# Patient Record
Sex: MALE | Race: WHITE | HISPANIC OR LATINO | Employment: FULL TIME | URBAN - METROPOLITAN AREA
[De-identification: names, ages, dates, MRNs, and addresses within clinical notes are randomized per-mention and may not be internally consistent; named-entity substitution may affect disease eponyms.]

---

## 2022-08-08 ENCOUNTER — APPOINTMENT (EMERGENCY)
Dept: VASCULAR ULTRASOUND | Facility: HOSPITAL | Age: 52
End: 2022-08-08

## 2022-08-08 ENCOUNTER — APPOINTMENT (EMERGENCY)
Dept: RADIOLOGY | Facility: HOSPITAL | Age: 52
End: 2022-08-08

## 2022-08-08 ENCOUNTER — HOSPITAL ENCOUNTER (EMERGENCY)
Facility: HOSPITAL | Age: 52
Discharge: HOME | End: 2022-08-08
Attending: INTERNAL MEDICINE | Admitting: INTERNAL MEDICINE

## 2022-08-08 VITALS
OXYGEN SATURATION: 100 % | HEART RATE: 72 BPM | BODY MASS INDEX: 28.25 KG/M2 | TEMPERATURE: 98.9 F | DIASTOLIC BLOOD PRESSURE: 101 MMHG | HEIGHT: 65 IN | RESPIRATION RATE: 18 BRPM | SYSTOLIC BLOOD PRESSURE: 155 MMHG | WEIGHT: 169.53 LBS

## 2022-08-08 DIAGNOSIS — M25.562 CHRONIC PAIN OF LEFT KNEE: Primary | ICD-10-CM

## 2022-08-08 DIAGNOSIS — M65.262 CALCIFIC TENDINITIS OF LEFT KNEE: ICD-10-CM

## 2022-08-08 DIAGNOSIS — G89.29 CHRONIC PAIN OF LEFT KNEE: Primary | ICD-10-CM

## 2022-08-08 DIAGNOSIS — M71.22 BAKER CYST, LEFT: ICD-10-CM

## 2022-08-08 PROCEDURE — 93971 EXTREMITY STUDY: CPT | Performed by: SURGERY

## 2022-08-08 PROCEDURE — 99285 EMERGENCY DEPT VISIT HI MDM: CPT | Performed by: PHYSICIAN ASSISTANT

## 2022-08-08 PROCEDURE — 99284 EMERGENCY DEPT VISIT MOD MDM: CPT

## 2022-08-08 PROCEDURE — 93971 EXTREMITY STUDY: CPT

## 2022-08-08 PROCEDURE — 73564 X-RAY EXAM KNEE 4 OR MORE: CPT

## 2022-08-08 PROCEDURE — 96372 THER/PROPH/DIAG INJ SC/IM: CPT

## 2022-08-08 RX ORDER — NAPROXEN 500 MG/1
500 TABLET ORAL 2 TIMES DAILY WITH MEALS
Qty: 30 TABLET | Refills: 0 | Status: SHIPPED | OUTPATIENT
Start: 2022-08-08

## 2022-08-08 RX ORDER — KETOROLAC TROMETHAMINE 30 MG/ML
15 INJECTION, SOLUTION INTRAMUSCULAR; INTRAVENOUS ONCE
Status: COMPLETED | OUTPATIENT
Start: 2022-08-08 | End: 2022-08-08

## 2022-08-08 RX ADMIN — KETOROLAC TROMETHAMINE 15 MG: 30 INJECTION, SOLUTION INTRAMUSCULAR at 11:29

## 2022-08-08 NOTE — ED PROVIDER NOTES
History  Chief Complaint   Patient presents with    Knee Pain     L knee pain x1 year, denies injury     This is a 50-year-old male with no significant past medical history presenting to the emergency department today for left knee pain  Knee pain is been ongoing for 1 year but notes he is feeling bone on bone sensation which made him come to the emergency department for evaluation today  He denies any known injury but did work as a  and is frequently on his feet  He is also noting new onset left-sided calf pain  He denies any chest pain or shortness of breath  He denies any difficulty with ambulation  Does not follow-up with orthopedics  No numbness or tingling  He denies any history of DVT  The patient denies other complaints at this time  History provided by:  Patient   used: No    Knee Pain  Location:  Knee  Time since incident: one year  Injury: no    Knee location:  L knee  Chronicity:  Chronic  Dislocation: no    Foreign body present:  Unable to specify  Tetanus status:  Unknown  Prior injury to area:  No  Relieved by:  None tried  Exacerbated by: movement; bearing weight  Ineffective treatments:  None tried  Associated symptoms: stiffness    Associated symptoms: no back pain, no decreased ROM, no fatigue, no fever, no itching, no muscle weakness, no neck pain, no numbness, no swelling and no tingling        None       History reviewed  No pertinent past medical history  History reviewed  No pertinent surgical history  History reviewed  No pertinent family history  I have reviewed and agree with the history as documented  E-Cigarette/Vaping    E-Cigarette Use Never User      E-Cigarette/Vaping Substances     Social History     Tobacco Use    Smoking status: Current Some Day Smoker     Packs/day: 0 25     Types: Cigarettes    Smokeless tobacco: Never Used   Vaping Use    Vaping Use: Never used   Substance Use Topics    Alcohol use:  Yes    Drug use: Not Currently       Review of Systems   Constitutional: Negative for appetite change, chills, diaphoresis, fatigue and fever  Eyes: Negative for visual disturbance  Respiratory: Negative for cough, chest tightness, shortness of breath and wheezing  Cardiovascular: Negative for chest pain, palpitations and leg swelling  Gastrointestinal: Negative for abdominal pain, constipation, diarrhea, nausea and vomiting  Genitourinary: Negative for dysuria  Musculoskeletal: Positive for arthralgias (left knee) and stiffness  Negative for back pain, joint swelling, neck pain and neck stiffness  Skin: Negative for itching, rash and wound  Neurological: Negative for dizziness, seizures, syncope, weakness, light-headedness, numbness and headaches  Psychiatric/Behavioral: Negative for confusion  All other systems reviewed and are negative  Physical Exam  Physical Exam  Vitals and nursing note reviewed  Constitutional:       General: He is not in acute distress  Appearance: Normal appearance  He is normal weight  He is not ill-appearing, toxic-appearing or diaphoretic  HENT:      Head: Normocephalic and atraumatic  Nose: Nose normal  No congestion or rhinorrhea  Mouth/Throat:      Mouth: Mucous membranes are moist       Pharynx: No oropharyngeal exudate or posterior oropharyngeal erythema  Eyes:      General: No scleral icterus  Right eye: No discharge  Left eye: No discharge  Conjunctiva/sclera: Conjunctivae normal    Cardiovascular:      Rate and Rhythm: Normal rate and regular rhythm  Pulses: Normal pulses  Heart sounds: Normal heart sounds  No murmur heard  No friction rub  No gallop  Comments: 2+ DP pulses bilaterally  Pulmonary:      Effort: Pulmonary effort is normal  No respiratory distress  Breath sounds: Normal breath sounds  No stridor  No wheezing, rhonchi or rales  Chest:      Chest wall: No tenderness     Musculoskeletal: General: Normal range of motion  Cervical back: Normal range of motion  No rigidity  Right lower leg: No edema  Left lower leg: No edema  Comments: Positive left-sided Umair sign; bilateral calves have soft compartments; negative right-sided Umair sign  Normal ability to flex and extend at left knee  Mild tenderness to palpation anteriorly and posteriorly to the left knee joint; no medial or lateral tenderness to palpation; no ligamentous laxity; negative Lachman   Skin:     General: Skin is warm and dry  Capillary Refill: Capillary refill takes less than 2 seconds  Coloration: Skin is not jaundiced or pale  Neurological:      General: No focal deficit present  Mental Status: He is alert and oriented to person, place, and time  Mental status is at baseline        Comments: 5/5 strength to bilateral lower extremities  Normal sensation of bilateral lower extremities   Psychiatric:         Mood and Affect: Mood normal          Behavior: Behavior normal          Vital Signs  ED Triage Vitals [08/08/22 1058]   Temperature Pulse Respirations Blood Pressure SpO2   98 9 °F (37 2 °C) 72 18 (!) 155/101 100 %      Temp Source Heart Rate Source Patient Position - Orthostatic VS BP Location FiO2 (%)   Oral Monitor Sitting Left arm --      Pain Score       9           Vitals:    08/08/22 1058   BP: (!) 155/101   Pulse: 72   Patient Position - Orthostatic VS: Sitting         Visual Acuity      ED Medications  Medications   ketorolac (TORADOL) injection 15 mg (15 mg Intramuscular Given 8/8/22 1129)       Diagnostic Studies  Results Reviewed     None                 XR knee 4+ views left injury   ED Interpretation by Lara Cosby PA-C (08/08 1120)   Degenerative changes noted to knee; calcific tendinitis noted      VAS lower limb venous duplex study, unilateral/limited    (Results Pending)              Procedures  Procedures         ED Course  ED Course as of 08/08/22 1200   Mon Aug 08, 2022 1139 Small left sided Baker's cyst  No DVT  MDM  Number of Diagnoses or Management Options  Baker cyst, left: new and requires workup  Calcific tendinitis of left knee: new and requires workup  Chronic pain of left knee: new and requires workup  Diagnosis management comments: This is a 48-year-old male presenting to the emergency department today for left-sided knee pain  Ongoing for 1 year  Also having left-sided calf pain that is more recent  Vital signs are stable  He has no chest pain or shortness of breath  Left knee x-ray shows calcific tendinitis and degenerative changes but no acute osseous abnormalities  Left venous duplex shows a small Baker cyst but is negative for DVT  The patient was given 1 dose of Toradol here in the emergency department  The patient is stable for discharge at this time  Recommend orthopedic follow-up  Naproxen and Voltaren cream sent to the patient's pharmacy  Strict return precautions were given  Recommend PCP follow-up as soon as possible  The patient and/or patient's proxy verify their understanding and agree to the plan at this time  All questions answered to the patient and/or their proxy's satisfaction  All labs reviewed and utilized in the medical decision making process  All radiology studies independently viewed by me and interpreted by the radiologist  Portions of the record may have been created with voice recognition software   Occasional wrong word or "sound a like" substitutions may have occurred due to the inherent limitations of voice recognition software   Read the chart carefully and recognize, using context, where substitutions have occurred         Amount and/or Complexity of Data Reviewed  Tests in the radiology section of CPT®: ordered and reviewed  Independent visualization of images, tracings, or specimens: yes (XR Left Knee)    Patient Progress  Patient progress: stable      Disposition  Final diagnoses:   Chronic pain of left knee   Baker cyst, left   Calcific tendinitis of left knee     Time reflects when diagnosis was documented in both MDM as applicable and the Disposition within this note     Time User Action Codes Description Comment    8/8/2022 11:52 AM Evangelista Cruz [G63 974,  G89 29] Chronic pain of left knee     8/8/2022 11:52 AM Gene Cruz [M71 22] Baker cyst, left     8/8/2022 11:53 AM Evangelista Cruz [T32 717] Calcific tendinitis of left knee       ED Disposition     ED Disposition   Discharge    Condition   Stable    Date/Time   Mon Aug 8, 2022 11:52 AM    Comment   Prince Castillo discharge to home/self care                 Follow-up Information     Follow up With Specialties Details Why Contact Info Additional [address] Emergency Department Emergency Medicine Go to  If symptoms worsen [address] [facility],[address] [address]  [address] [facility] Emergency Department, [address], [address] [facility] Orthopedic Surgery Schedule an appointment as soon as possible for a visit   [address] [facility],[address] [address] [phone] [address] [facility] [address] [facility] [phone] [address] Schedule an appointment as soon as possible for a visit   [address] [address]  [address] , [place], [place], [address]          Patient's Medications   Discharge Prescriptions    DICLOFENAC SODIUM (VOLTAREN) 1 %    Apply 2 g topically 4 (four) times a day For pain to affected area       Start Date: 8/8/2022  End Date: --       Order Dose: 2 g       Quantity: 100 g    Refills: 0    NAPROXEN (NAPROSYN) 500 MG TABLET    Take 1 tablet (500 mg total) by mouth 2 (two) times a day with meals       Start Date: 8/8/2022  End Date: --       Order Dose: 500 mg       Quantity: 30 tablet    Refills: 0       No discharge procedures on file      PDMP Review     None          ED Provider  Electronically Signed by           Ad Carter PA-C  08/08/22 1200 none...

## 2022-08-08 NOTE — TELEPHONE ENCOUNTER
Patient seen in ER  Attempted to make appt for ortho  Advised of self pay policy  Patient has no MA  Presented him with number for [address]

## 2022-08-08 NOTE — DISCHARGE INSTRUCTIONS
Please return to the emergency department for worsening symptoms including chest pain, shortness of breath, dizziness, lightheadedness, fever greater than 103, severe pain, inability to walk, fainting episodes, etc  Please follow-up with your family practice provider as soon as possible  I sent 2 medications over to the pharmacy for your symptoms  Please take as directed  Please follow-up with an orthopedist as soon as possible  They will be able to help manage your chronic arthritis to the left knee  No difficulties

## 2022-08-27 ENCOUNTER — OFFICE VISIT (OUTPATIENT)
Dept: OBGYN CLINIC | Facility: CLINIC | Age: 52
End: 2022-08-27

## 2022-08-27 VITALS — OXYGEN SATURATION: 98 % | HEIGHT: 65 IN | HEART RATE: 63 BPM | BODY MASS INDEX: 28.89 KG/M2 | WEIGHT: 173.4 LBS

## 2022-08-27 DIAGNOSIS — M17.12 PRIMARY OSTEOARTHRITIS OF LEFT KNEE: ICD-10-CM

## 2022-08-27 DIAGNOSIS — G89.29 CHRONIC PAIN OF LEFT KNEE: Primary | ICD-10-CM

## 2022-08-27 DIAGNOSIS — M25.562 CHRONIC PAIN OF LEFT KNEE: Primary | ICD-10-CM

## 2022-08-27 PROCEDURE — 99214 OFFICE O/P EST MOD 30 MIN: CPT | Performed by: PHYSICIAN ASSISTANT

## 2022-08-27 PROCEDURE — 20610 DRAIN/INJ JOINT/BURSA W/O US: CPT | Performed by: PHYSICIAN ASSISTANT

## 2022-08-27 RX ORDER — BUPIVACAINE HYDROCHLORIDE 2.5 MG/ML
2 INJECTION, SOLUTION INFILTRATION; PERINEURAL
Status: COMPLETED | OUTPATIENT
Start: 2022-08-27 | End: 2022-08-27

## 2022-08-27 RX ORDER — TRIAMCINOLONE ACETONIDE 40 MG/ML
80 INJECTION, SUSPENSION INTRA-ARTICULAR; INTRAMUSCULAR
Status: COMPLETED | OUTPATIENT
Start: 2022-08-27 | End: 2022-08-27

## 2022-08-27 RX ADMIN — TRIAMCINOLONE ACETONIDE 80 MG: 40 INJECTION, SUSPENSION INTRA-ARTICULAR; INTRAMUSCULAR at 10:20

## 2022-08-27 RX ADMIN — BUPIVACAINE HYDROCHLORIDE 2 ML: 2.5 INJECTION, SOLUTION INFILTRATION; PERINEURAL at 10:20

## 2022-08-27 NOTE — PROGRESS NOTES
Orthopaedic Surgery - Office Note  Merna Bran (1 y o  male)   : 1970   MRN: [id number]  Encounter Date: 2022    Chief Complaint   Patient presents with    Left Knee - Pain         Assessment/Plan  Diagnoses and all orders for this visit:    Chronic pain of left knee    Primary osteoarthritis of left knee    Other orders  -     Large joint arthrocentesis    I reviewed with the patient he has advanced degenerative changes of his left knee  Conservative treatment options would consist of ice oral anti-inflammatory such as Aleve with food, bracing, physical therapy, cortisone injections, Visco injections, and the definitive treatment for the left knee osteoarthritis would be a total knee replacement once conservative treatments have failed  After reviewing treatment options and his insurance situation he would like to try a cortisone injection today and consider the Visco injections in the future if the cortisone injection does not help  He will be provided a Khmer version home exercise sheet for the need to begin working on  He will continue to ice the knee 20 minutes on 1 hour off 3 times a day and use Aleve with food as needed for pain and inflammation  Return in 2-3 months with Dr Kristan Chirinos  History of Present Illness  This is a previous orthopedic patient here for a repeat evaluation of his chronic left knee pain  Patient has had ongoing left knee pain for at least 4 years  He was evaluated by Dr Kristan Chirinos in 2022 for this condition  Treatment options were reviewed at that time but due to his insurance situation patient declined to consider the cortisone injection  Since that time he has gone to the emergency room due to the left knee pain and states he is working on getting medical assistance  He would like to consider an injection at this time in the left knee  He denies any new trauma to the left knee    He does not speak [place] and translation is provided through his mother at the patient's request   No calf pain or paresthesias are reported  He denies any fever or chills  He denies being diabetic  Review of Systems  Pertinent items are noted in HPI  All other systems were reviewed and are negative  Physical Exam  Pulse 63   Ht 5' 5" (1 651 m)   Wt 78 7 kg (173 lb 6 4 oz)   SpO2 98%   BMI 28 86 kg/m²   Cons: Appears well  No apparent distress  Psych: Alert  Oriented x3  Mood and affect normal   Eyes: PERRLA, EOMI  Resp: Normal effort  No audible wheezing or stridor  CV: Palpable pulse  No discernable arrhythmia  Lymph:  No palpable cervical, axillary, or inguinal lymphadenopathy  Skin: Warm  No palpable masses  No visible lesions  Neuro: Normal muscle tone  Normal and symmetric DTR's  Left knee is without skin breakdown lesions or signs of infection  There is no intra-articular effusion today  He is tender on the medial and lateral joint line with noted varus deformity  He has near full extension and flexes to 120° with noted crepitus  Quad and hamstring strength are 5/5  There is no calf tenderness and a negative Homans  He has no instability to valgus or varus stress at full extension and 30°  He has no instability to Lachman's testing  His gait is heel-to-toe  He has a negative straight leg raise          Studies Reviewed  Study Result    Narrative & Impression   LEFT KNEE     INDICATION:   Knee Pain      COMPARISON:  None     VIEWS:  XR KNEE 4+ VW LEFT INJURY   Images: 4     FINDINGS:     There is no acute fracture or dislocation      There is no joint effusion      Moderate to severe degenerative changes involve the medial and patellofemoral compartments  Mild degenerative changes lateral compartment     No lytic or blastic osseous lesion      Soft tissues are unremarkable      IMPRESSION:  Tricompartmental degenerative arthritis  No acute osseous abnormality            Workstation performed: [id number]     X-ray images as well as reports were reviewed by myself in the office today and I agree were 30 all just interpretation of advanced degenerative changes  Emergency department notes from 08/08/2022 reviewed by myself in the office today  Large joint arthrocentesis: L knee  Universal Protocol:  Consent: Verbal consent obtained  Risks and benefits: risks, benefits and alternatives were discussed  Consent given by: patient  Time out: Immediately prior to procedure a "time out" was called to verify the correct patient, procedure, equipment, support staff and site/side marked as required  Patient understanding: patient states understanding of the procedure being performed  Patient consent: the patient's understanding of the procedure matches consent given  Relevant documents: relevant documents present and verified  Test results: test results available and properly labeled  Site marked: the operative site was marked  Radiology Images displayed and confirmed  If images not available, report reviewed: imaging studies available  Patient identity confirmed: verbally with patient    Supporting Documentation  Indications: pain   Procedure Details  Location: knee - L knee  Preparation: Patient was prepped and draped in the usual sterile fashion  Needle size: 22 G  Ultrasound guidance: no  Approach: anterolateral  Medications administered: 2 mL bupivacaine 0 25 %; 80 mg triamcinolone acetonide 40 mg/mL    Patient tolerance: patient tolerated the procedure well with no immediate complications  Dressing:  Sterile dressing applied        Medical, Surgical, Family, and Social History  The patient's medical history, family history, and social history, were reviewed and updated as appropriate  History reviewed  No pertinent past medical history  History reviewed  No pertinent surgical history  History reviewed  No pertinent family history      Social History     Occupational History    Not on file   Tobacco Use    Smoking status: Current Some Day Smoker     Packs/day: 0 25     Types: Cigarettes    Smokeless tobacco: Never Used   Vaping Use    Vaping Use: Never used   Substance and Sexual Activity    Alcohol use:  Yes    Drug use: Not Currently    Sexual activity: Not on file       No Known Allergies      Current Outpatient Medications:     Diclofenac Sodium (VOLTAREN) 1 %, Apply 2 g topically 4 (four) times a day For pain to affected area, Disp: 100 g, Rfl: 0    naproxen (Naprosyn) 500 mg tablet, Take 1 tablet (500 mg total) by mouth 2 (two) times a day with meals, Disp: 30 tablet, Rfl: 0      Adam Saez PA-C

## 2022-08-27 NOTE — PATIENT INSTRUCTIONS
Ejercicios para la rodilla   CUIDADO AMBULATORIO:   Lo que necesita saber acerca de los ejercicios para la rodilla: Los ejercicios para la rodilla ayudan a fortalecer los músculos alrededor de burnham rodilla  Unos músculos cecille pueden ayudar a reducir el dolor y disminuir el riesgo de sufrir rd lesión en el futuro  Los ejercicios para la rodilla también pueden ayudarlo a recuperarse después de [facility] Su Mohry Schwab  Empiece despacio  Estos son Concepcion Taylor básicos  Pregúntele a burnham médico si usted necesita acudir con un fisioterapeuta para que le indique ejercicios más avanzado  A medida que se sienta más jacklyn, es posible que pueda realizar más series de cada ejercicio o añadir pesas  Deténgase si siente dolor  Es normal que sienta cierta molestia al principio  Practicar los ejercicios con regularidad ayudará a disminuir burnham incomodidad con el paso del Joy  Realice los [id number] [facility] Jen  Owen esto para [address] [facility] [address] se mantengan cecille  Entre en calor antes de hacer los Concepcion Brandon para la rodilla  Use rd bicicleta estacionaria o camine wang 5 a 10 minutos para que nolvia músculos entren en calor  Cómo realizar estiramientos de la rodilla de forma shah: Siempre ejecute los ejercicios de calentamiento antes de hacer cualquier ejercicio de acondicionamiento  Owen estos ejercicios de estiramiento rd vez más después de hacer los ejercicios de fortalecimiento  Realice estos ejercicios de estiramiento entre 4 o 5 días a la semana o según se lo indicaron  De pie, calentamiento para la pantorrilla: De frente a rd pared, coloque ambas cayden abiertas contra la pared, o agárrese del espaldar de rd silla para mantener el equilibrio  Mantenga las rodillas ligeramente dobladas  Dé un gran paso para atrás con rd pierna  Deje burnham otra pierna directamente debajo de fawad Reynoso y presione con nolvia caderas hacia adelante   Sostenga el estiramiento por 30 segundos  Cambie de pierna  Repita rosa ejercicio 2 o 3 veces con cada pierna  Estiramiento de los cuádriceps de pie: [employer], coloque rd mano contra rd pared, o agárrese del espaldar de rd silla para mantener el equilibrio  Cargue el peso de burnham cuerpo en rd pierna, flexione la rodilla de la otra pierna y tómese del tobillo  Acerque el tobillo a nolvia nalgas  Sostenga el estiramiento de 30 a 60 segundos  Cambie de pierna  Repita rosa ejercicio 2 o 3 veces con cada pierna  Sentado, estiramiento del tendón de la corva, parte posterior del muslo: Siéntese en rd superficie plana en el piso con las dos piernas enfrente de usted  No flexione nolvia dedos de los pies ni los ponga en puntas  Coloque las rey de nolvia cayden en el piso y deslice nolvia cayden hacia adelante hasta que usted sienta rd resistencia o un estiramiento leve  Debe mantener la espalda derecha  Sostenga el estiramiento por 30 segundos  Repita 2 o 3 veces  Cómo realizar ejercicios de fortalecimiento de la rodilla de manera shah: Realice estos ejercicios 4 o 5 días a la semana o según le indicaron  Medias sentadillas de pie: Párese con los pies a la distancia de nolvia hombros  Lleve burnham espalda contra rd pared o agárrese del espaldar de rd silla para mantener el equilibrio, si es necesario  [id number] y baje unas 10 pulgadas lentamente, heri si fuera a sentarse en rd silla  El [place] de burnham cuerpo debería encontrarse mayormente sobre nolvia talones  Sostenga las sentadillas por 5 segundos, luego regresa a la posición inicial  Realice 3 series de 10 sentadillas para fortalecer los glúteos y los muslos  De pie flexión de los músculos isquiotibiales: De frente a rd pared, coloque ambas cayden abiertas contra la pared, o agárrese del espaldar de rd silla para mantener el equilibrio  Cargue el peso de burnham cuerpo en rd pierna, levante otra pierna y lleve burnham talón tan cerca de los glúteos heri pueda   Sostenga por 5 segundos y baje burnham pierna  Realice 2 series de 10 flexiones con cada pierna  Susan ejercicio fortalece el músculo de la parte posterior de burnham muslo  De pie levantamiento de las pantorillas o gémelos: De frente a rd pared, coloque ambas cayden abiertas contra la pared, o agárrese del espaldar de rd silla para mantener el equilibrio  Póngase de pie derecho, y no se zach hacia adelante  Coloque todo burnham peso sobre rd hannah pierna levantando el otro pie del suelo  Levante el talón del pie que está en el piso tan alto heri pueda y Klyndseybewiliamn K  Realice 2 series de 10 levantamientos de pantorilla con cada pierna para fortalecer los músculos de la pantorilla  Enderezar la pierna y levantarla: Acuéstese boca abajo con las piernas estiradas  Cruce nolvia brazos enfrente de usted y descanse la frente sobre nolvia brazos cruzados  Apriete el músculo de burnham pierna y levántela tanto heri pueda  Sostenga por 5 segundos, y luego baje burnham pierna  Realice 2 series de 10 levantamientos con cada pierna para fortalecer nolvia glúteos  Sentado, levantamiento de pierna: Siéntese en rd silla  Despacio enderece y levante rd pierna  Contraiga el músculo de burnham muslo y sostenga por 5 segundos  Relaje y regrese burnham pie al piso  Realice 2 series de 10 levantamientos con cada pierna  [place] le ayuda a fortalecer el músculo anterior de burnham muslo  Comuníquese con burnham médico si:  Usted tiene un nuevo dolor o el dolor YAMILA  Usted tiene preguntas o inquietudes acerca de burnham condición o cuidado  © [employer] 2022 Information is for End User's use only and may not be sold, redistributed or otherwise used for commercial purposes  All illustrations and images included in CareNotes® are the copyrighted property of A Sunday SOLANO  or [address] [place] [address] es sólo para uso en educación  Burnham intención no es darle un consejo médico sobre enfermedades o tratamientos   Colsulte con burnham médico, enfermera o farmacéutico antes de seguir cualquier régimen médico para saber si es seguro y efectivo para usted